# Patient Record
Sex: MALE | Race: WHITE
[De-identification: names, ages, dates, MRNs, and addresses within clinical notes are randomized per-mention and may not be internally consistent; named-entity substitution may affect disease eponyms.]

---

## 2017-05-24 ENCOUNTER — HOSPITAL ENCOUNTER (EMERGENCY)
Dept: HOSPITAL 25 - UCCORT | Age: 62
Discharge: HOME | End: 2017-05-24
Payer: MEDICARE

## 2017-05-24 VITALS — DIASTOLIC BLOOD PRESSURE: 82 MMHG | SYSTOLIC BLOOD PRESSURE: 134 MMHG

## 2017-05-24 DIAGNOSIS — Z87.891: ICD-10-CM

## 2017-05-24 DIAGNOSIS — Z88.0: ICD-10-CM

## 2017-05-24 DIAGNOSIS — J06.9: Primary | ICD-10-CM

## 2017-05-24 DIAGNOSIS — J44.9: ICD-10-CM

## 2017-05-24 PROCEDURE — 99212 OFFICE O/P EST SF 10 MIN: CPT

## 2017-05-24 PROCEDURE — G0463 HOSPITAL OUTPT CLINIC VISIT: HCPCS

## 2017-05-24 NOTE — UC
Throat Pain/Nasal Payam HPI





- HPI Summary


HPI Summary: 





THREE DAYS OF PRODUCTIVE COUGH, SINUS CONGESTION, CHEST CONGESTION. NO FEVER. 

NO WHEEZING. ON COUMADIN. 





- History of Current Complaint


Chief Complaint: UCRespiratory


Stated Complaint: COUGH,CHEST CONGESTION,HEADACHE


Time Seen by Provider: 05/24/17 10:01


Hx Obtained From: Patient


Onset/Duration: Gradual Onset, Lasting Days, Still Present


Severity: Mild


Cough: Productive


Associated Signs & Symptoms: Positive: Sinus Discomfort, Nasal Discharge





- Epiglottits Risk Factors


Epiglottis Risk Factors: Negative





- Allergies/Home Medications


Allergies/Adverse Reactions: 


 Allergies











Allergy/AdvReac Type Severity Reaction Status Date / Time


 


Penicillins Allergy Intermediate Hives Verified 05/24/17 09:55











Home Medications: 


 Home Medications





Warfarin TAB(*) [Coumadin TAB(*)] 6 mg PO DAILY@1700 05/24/17 [History 

Confirmed 05/24/17]











PMH/Surg Hx/FS Hx/Imm Hx


Previously Healthy: Yes


Endocrine History Of: 


   Denies: Diabetes


Cardiovascular History Of: 


   Denies: Cardiac Disorders, Hypertension, Pacemaker/ICD


Respiratory History Of: Reports: COPD


   Denies: Asthma





- Surgical History


Surgical History: Yes


Surgery Procedure, Year, and Place: Lumbar fusion 2005, appy 3/15.  Appy 4/15





- Family History


Known Family History: Positive: Cardiac Disease - CHF


   Negative: Hypertension, Diabetes





- Social History


Occupation: Retired


Lives: With Family


Alcohol Use: None


Substance Use Type: None


Smoking Status (MU): Former Smoker


When Did the Patient Quit Smoking/Using Tobacco: MARCH, 2014





- Immunization History


Most Recent Influenza Vaccination: 10/2015


Most Recent Pneumonia Vaccination: 2015





Review of Systems


Constitutional: Negative


Skin: Negative


Eyes: Negative


ENT: Nasal Discharge


Respiratory: Cough


Cardiovascular: Negative


Gastrointestinal: Negative


Genitourinary: Negative


Motor: Negative


Neurovascular: Negative


Musculoskeletal: Negative


Neurological: Negative


Psychological: Negative


All Other Systems Reviewed And Are Negative: Yes





Physical Exam


Triage Information Reviewed: Yes


Appearance: Well-Appearing, No Pain Distress, Well-Nourished


Vital Signs: 


 Initial Vital Signs











Temp  98.2 F   05/24/17 09:52


 


Pulse  75   05/24/17 09:52


 


Resp  16   05/24/17 09:52


 


BP  134/82   05/24/17 09:52


 


Pulse Ox  98   05/24/17 09:52











Vital Signs Reviewed: Yes


Eye Exam: Normal


ENT Exam: Normal


ENT: Positive: Normal ENT inspection, Hearing grossly normal, TMs normal


Dental Exam: Normal


Neck exam: Normal


Neck: Positive: Supple, Nontender, No Lymphadenopathy


Respiratory Exam: Normal


Respiratory: Positive: Chest non-tender, Lungs clear, Normal breath sounds, No 

respiratory distress


Cardiovascular Exam: Normal


Cardiovascular: Positive: RRR, No Murmur, Pulses Normal


Abdominal Exam: Normal


Musculoskeletal Exam: Normal


Neurological Exam: Normal


Psychological Exam: Normal


Skin Exam: Normal





Throat Pain/Nasal Course/Dx





- Differential Dx/Diagnosis


Differential Diagnosis/HQI/PQRI: Sinusitis, URI, Other - BRONCHITIS


Provider Diagnoses: UPPER RESPIRATORY INFECTION





Discharge





- Discharge Plan


Condition: Stable


Disposition: HOME


Prescriptions: 


Benzonatate CAP* [Tessalon 100 MG CAP*] 100 mg PO TID PRN #15 cap


 PRN Reason: Cough


Patient Education Materials:  Upper Respiratory Infection (ED)


Referrals: 


Antwan Moreno MD [Primary Care Provider] -

## 2017-09-27 ENCOUNTER — HOSPITAL ENCOUNTER (EMERGENCY)
Dept: HOSPITAL 25 - UCCORT | Age: 62
Discharge: HOME | End: 2017-09-27
Payer: MEDICARE

## 2017-09-27 VITALS — SYSTOLIC BLOOD PRESSURE: 137 MMHG | DIASTOLIC BLOOD PRESSURE: 86 MMHG

## 2017-09-27 DIAGNOSIS — S00.261A: Primary | ICD-10-CM
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
## 2017-09-27 DIAGNOSIS — H60.93: ICD-10-CM
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
## 2017-09-27 PROCEDURE — 99212 OFFICE O/P EST SF 10 MIN: CPT

## 2017-09-27 PROCEDURE — G0463 HOSPITAL OUTPT CLINIC VISIT: HCPCS

## 2017-09-27 NOTE — UC
Eye Complaint HPI





- HPI Summary


HPI Summary: 





He was working outside yesterday and today noted a small swelling in the inner 

corner of the right eye. It is not tender. No known trauma. there is also ev 

ear itching and tenderness. 





- History of Current Complaint


Chief Complaint: UCEye


Stated Complaint: RIGHT EYE COMPLAINT


Time Seen by Provider: 09/27/17 10:03


Hx Obtained From: Patient


Onset/Duration: Gradual Onset, Lasting Days


Timing: Constant


Severity Initially: Mild


Severity Currently: Mild


Location of Injury: Periorbital


Aggravating Factor(s): Nothing


Alleviating Factor(s): Nothing


Associated Signs And Symptoms: Positive: Swelling - of the inner eye lid. this 

started this morning. the ears have been bothersome for several days..  Negative

: Photophobia, Drainage (Clear), Drainage (Purulent), Vision Impairment 

Bilateral, Vision Impairment Right, Vision Impairment Left, Fever





- Allergies/Home Medications


Allergies/Adverse Reactions: 


 Allergies











Allergy/AdvReac Type Severity Reaction Status Date / Time


 


Penicillins Allergy Intermediate Hives Verified 09/27/17 09:48














PMH/Surg Hx/FS Hx/Imm Hx


Previously Healthy: No





- Surgical History


Surgical History: Yes


Surgery Procedure, Year, and Place: Lumbar fusion 2005, appy 3/15.  Appy 4/15





- Family History


Known Family History: Positive: Cardiac Disease - CHF


   Negative: Hypertension, Diabetes





- Social History


Alcohol Use: None


Substance Use Type: None


Smoking Status (MU): Former Smoker


When Did the Patient Quit Smoking/Using Tobacco: MARCH, 2014





- Immunization History


Most Recent Influenza Vaccination: no 2017-18


Most Recent Pneumonia Vaccination: 2015





Review of Systems


Eyes: Other - eye lid swelling.


ENT: Ear Ache


All Other Systems Reviewed And Are Negative: Yes





Physical Exam


Triage Information Reviewed: Yes


Appearance: Well-Appearing, No Pain Distress, Well-Nourished


Vital Signs: 


 Initial Vital Signs











Temp  98.5 F   09/27/17 09:43


 


Pulse  71   09/27/17 09:43


 


Resp  15   09/27/17 09:43


 


BP  137/86   09/27/17 09:43


 


Pulse Ox  97   09/27/17 09:43











Vital Signs Reviewed: Yes


ENT: Positive: Pharynx normal, Other: - ev canals mildly tender. no obvious 

swelling..  Negative: Pharyngeal erythema, Nasal congestion, TMs normal, TM 

bulging, Tonsillar swelling, Tonsillar exudate, Trismus


Neck exam: Normal


Neck: Positive: Supple, Nontender, No Lymphadenopathy


Respiratory Exam: Normal


Respiratory: Positive: Normal breath sounds, No respiratory distress


Cardiovascular: Positive: Brisk Capillary Refill


Abdomen Description: Negative: Distended


Musculoskeletal: Positive: Strength Intact, No Edema


Neurological: Positive: Alert, Muscle Tone Normal


Skin: Positive: rashes - right inner eye lid soft swelling without redness or 

fluctuance.





Eye Complaint Course/Dx





- Course


Course Of Treatment: we agreed to observe the eye lid and if it worsens, we can 

start the doxycline. He was warned that the antibiotics may affect coumadin and 

INR and that he will need to be rechecked.





- Differential Dx/Diagnosis


Differential Diagnosis/HQI/PQRI: Conjunctivitis, Corneal Abrasion


Provider Diagnoses: right eye lid insect bite.  ev otitis externa.





Discharge





- Discharge Plan


Condition: Good


Disposition: HOME


Prescriptions: 


Ciproflox/Dexameth OTIC.SUSP* [Ciprodex OTIC.SUSP*] 2 drop .SEE ORDER TID #1 btl


DOXYcycline CAP(*) [DOXYcycline 100MG CAP(*)] 100 mg PO BID #14 cap


Patient Education Materials:  Otitis Externa (ED), Insect Bite or Sting (ED)


Referrals: 


Antwan Moreno MD [Primary Care Provider] - If Needed

## 2018-01-14 ENCOUNTER — HOSPITAL ENCOUNTER (EMERGENCY)
Dept: HOSPITAL 25 - UCCORT | Age: 63
Discharge: HOME | End: 2018-01-14
Payer: MEDICARE

## 2018-01-14 VITALS — SYSTOLIC BLOOD PRESSURE: 138 MMHG | DIASTOLIC BLOOD PRESSURE: 89 MMHG

## 2018-01-14 DIAGNOSIS — Z87.891: ICD-10-CM

## 2018-01-14 DIAGNOSIS — Z86.718: ICD-10-CM

## 2018-01-14 DIAGNOSIS — Z88.0: ICD-10-CM

## 2018-01-14 DIAGNOSIS — M54.42: Primary | ICD-10-CM

## 2018-01-14 PROCEDURE — 99212 OFFICE O/P EST SF 10 MIN: CPT

## 2018-01-14 PROCEDURE — G0463 HOSPITAL OUTPT CLINIC VISIT: HCPCS

## 2018-01-14 NOTE — UC
Back Pain HPI





- HPI Summary


HPI Summary: 





61 yo male with onset of left lower back pain radiating down left leg


constant x 2-3 weeks


has hx sciatica with lumbar spine surgery





no bowel/bladder dysfunction





unable to take NSAIDs (on coumadin)











- History of Current Complaint


Chief Complaint: UCBackPain


Stated Complaint: BACK PAIN


Time Seen by Provider: 01/14/18 11:46


Hx Obtained From: Patient


Onset/Duration: Gradual Onset, Lasting Weeks - 2-3


Timing: Constant


Severity Initially: Mild


Severity Currently: Severe


Pain Intensity: 8


Pain Scale Used: 0-10 Numeric


Back Pain: Is Discrete @, Radiates To - left leg to foot


Character: Aching, Burning


Aggravating Factor(s): Other - sitting/raising left leg


Alleviating Factor(s): Rest, Position


Associated Signs And Symptoms: Positive: Negative


Related History: Similar Episode Dx As - sciatica





- Allergies/Home Medications


Allergies/Adverse Reactions: 


 Allergies











Allergy/AdvReac Type Severity Reaction Status Date / Time


 


Penicillins Allergy Intermediate Hives Verified 01/14/18 11:11











Home Medications: 


 Home Medications





Acetaminophen [Eql Acetaminophen Extra] 500 mg PO PRN 01/14/18 [History]


Tiotropium Bromide-Olodaterol [Stiolto Respimat 2.5-2.5 Mcg/Act] 1 aer IN 01/14/ 18 [History]











PMH/Surg Hx/FS Hx/Imm Hx


Previously Healthy: Yes


Cardiovascular History: Deep Vein Thrombosis


Respiratory History: Pulmonary Embolism





- Surgical History


Surgical History: Yes


Surgery Procedure, Year, and Place: Lumbar fusion 2005,.  Appy 4/15.  

PERITONITIS ?





- Family History


Known Family History: Positive: Cardiac Disease - CHF, Other - no FHx DVT


   Negative: Hypertension, Diabetes





- Social History


Alcohol Use: None


Substance Use Type: None


Smoking Status (MU): Former Smoker


When Did the Patient Quit Smoking/Using Tobacco: MARCH, 2014





- Immunization History


Most Recent Influenza Vaccination: no 2017-18


Most Recent Pneumonia Vaccination: 2015





Review of Systems


Constitutional: Negative


Skin: Negative


Eyes: Negative


ENT: Negative


Respiratory: Negative


Cardiovascular: Negative


Gastrointestinal: Negative


Genitourinary: Negative


Motor: Negative


Neurovascular: Negative


Musculoskeletal: Arthralgia, Myalgia


Neurological: Negative


Psychological: Negative


Is Patient Immunocompromised?: No


All Other Systems Reviewed And Are Negative: Yes





Physical Exam


Triage Information Reviewed: Yes


Appearance: Well-Appearing, No Pain Distress, Well-Nourished


Vital Signs: 


 Initial Vital Signs











Temp  98.7 F   01/14/18 11:14


 


Pulse  92   01/14/18 11:14


 


Resp  18   01/14/18 11:14


 


BP  138/89   01/14/18 11:14


 


Pulse Ox  99   01/14/18 11:14











Vital Signs Reviewed: Yes


Eyes: Positive: Conjunctiva Clear


ENT: Negative: Hearing grossly normal - [place], Nasal congestion, Nasal drainage, 

Trismus, Muffled voice, Hoarse voice


Neck: Positive: Supple, Nontender


Respiratory: Positive: Normal breath sounds, No respiratory distress, No 

accessory muscle use, Respiratory distress


Cardiovascular: Positive: RRR, No Murmur


Neurological: Positive: Alert, Other: - decreased ankle jerk left (+) SLR left


Psychological Exam: Normal


Skin Exam: Normal





Back Pain Course/Dx





- Differential Dx/Diagnosis


Provider Diagnoses: acute sciatica





Discharge





- Discharge Plan


Condition: Stable


Disposition: HOME


Prescriptions: 


Cyclobenzaprine TAB* [Flexeril TAB*] 5 mg PO TID PRN #15 tab


 PRN Reason: Spasms


HYDROcodone/ACETAMIN 5-325 MG* [Norco 5-325 TAB*] 1 tab PO Q4H PRN #15 tab MDD 4


 PRN Reason: Pain


Methylprednisolone [Medrol Dosepak 4 MG*] 0 mg PO .SEE LIAM INSTRUCTION #1 tab


Patient Education Materials:  Sciatica (ED)


Referrals: 


Antwan Moreno MD [Primary Care Provider] - As Soon As Possible


Additional Instructions: 


recheck this week


to ER for new or worsening symptoms

## 2018-04-20 ENCOUNTER — HOSPITAL ENCOUNTER (EMERGENCY)
Dept: HOSPITAL 25 - UCCORT | Age: 63
Discharge: HOME | End: 2018-04-20
Payer: MEDICARE

## 2018-04-20 VITALS — SYSTOLIC BLOOD PRESSURE: 140 MMHG | DIASTOLIC BLOOD PRESSURE: 87 MMHG

## 2018-04-20 DIAGNOSIS — J44.9: Primary | ICD-10-CM

## 2018-04-20 DIAGNOSIS — Z88.0: ICD-10-CM

## 2018-04-20 DIAGNOSIS — Z87.891: ICD-10-CM

## 2018-04-20 PROCEDURE — G0463 HOSPITAL OUTPT CLINIC VISIT: HCPCS

## 2018-04-20 PROCEDURE — 99212 OFFICE O/P EST SF 10 MIN: CPT

## 2018-04-20 NOTE — UC
Respiratory Complaint HPI





- HPI Summary


HPI Summary: 





Patient presents with increased cough and chest tightness, chills, hx of COPD





- History of Current Complaint


Hx Obtained From: Patient


Onset/Duration: Sudden Onset, Lasting Days


Timing: Constant


Severity Initially: Mild


Severity Currently: Mild


Pain Intensity: 0


Character: Cough: Nonproductive


Aggravating Factors: Deep Breaths


Alleviating Factors: Nothing


Associated Signs And Symptoms: Positive: Chills, Wheezing





<Margret Carvalho - Last Filed: 04/22/18 12:35>





<Adrianne Singh - Last Filed: 04/22/18 16:38>





- History of Current Complaint


Chief Complaint: UCRespiratory


Stated Complaint: COUGH, CONGESTION


Time Seen by Provider: 04/20/18 10:33





- Allergies/Home Medications


Allergies/Adverse Reactions: 


 Allergies











Allergy/AdvReac Type Severity Reaction Status Date / Time


 


Penicillins Allergy  Hives Verified 04/20/18 10:04











Home Medications: 


 Home Medications





guaiFENesin ER TAB [Mucinex*] 1,200 mg PO Q6H PRN 04/20/18 [History Confirmed 04 /20/18]











PMH/Surg Hx/FS Hx/Imm Hx


Previously Healthy: No


Cardiovascular History: Cardiac Disease


Respiratory History: COPD





- Surgical History


Surgical History: Yes


Surgery Procedure, Year, and Place: Lumbar fusion 2005,.  Appy 4/15.  

PERITONITIS





- Family History


Known Family History: Positive: Cardiac Disease - CHF, Other - no FHx DVT


   Negative: Hypertension, Diabetes





- Social History


Alcohol Use: None


Substance Use Type: None


Smoking Status (MU): Former Smoker


When Did the Patient Quit Smoking/Using Tobacco: MARCH, 2010





- Immunization History


Most Recent Influenza Vaccination: no 2017-18


Most Recent Pneumonia Vaccination: 2015





<Margret Carvalho - Last Filed: 04/22/18 12:35>





Review of Systems


Constitutional: Chills, Fatigue


Skin: Negative


Eyes: Negative


ENT: Negative


Respiratory: Shortness Of Breath, Cough


Cardiovascular: Negative


Gastrointestinal: Negative


Genitourinary: Negative


Motor: Negative


Neurovascular: Negative


Musculoskeletal: Negative


Neurological: Headache


Psychological: Negative


Is Patient Immunocompromised?: No


All Other Systems Reviewed And Are Negative: Yes





<Margret Carvalho - Last Filed: 04/22/18 12:35>





Physical Exam


Triage Information Reviewed: Yes


Appearance: Well-Nourished, Ill-Appearing, Pain Distress


Vital Signs: 


 Initial Vital Signs











Temp  98.5 F   04/20/18 09:55


 


Pulse  86   04/20/18 09:55


 


Resp  18   04/20/18 09:55


 


BP  140/87   04/20/18 09:55


 


Pulse Ox  97   04/20/18 09:55











Vital Signs Reviewed: Yes


Eye Exam: Normal


ENT: Positive: Pharyngeal erythema, TM bulging, TM dull


Dental Exam: Normal


Neck exam: Normal


Neck: Positive: Supple, Nontender


Respiratory: Positive: Chest non-tender, No respiratory distress, No accessory 

muscle use, Wheezing, Inspiration


Cardiovascular Exam: Normal


Cardiovascular: Positive: RRR, No Murmur, Pulses Normal


Abdominal Exam: Normal


Abdomen Description: Positive: Nontender, No Organomegaly, Soft


Bowel Sounds: Positive: Present


Musculoskeletal Exam: Normal


Neurological Exam: Normal


Psychological Exam: Normal


Skin Exam: Normal





<aMrgret Carvalho - Last Filed: 04/22/18 12:35>


Vital Signs: 


 Initial Vital Signs











Temp  98.5 F   04/20/18 09:55


 


Pulse  86   04/20/18 09:55


 


Resp  18   04/20/18 09:55


 


BP  140/87   04/20/18 09:55


 


Pulse Ox  97   04/20/18 09:55














<Adrianne Singh - Last Filed: 04/22/18 16:38>





UC Diagnostic Evaluation





- Laboratory


O2 Sat by Pulse Oximetry: 97





<Margret Carvalho - Last Filed: 04/22/18 12:35>





Respiratory Course/Dx





- Course


Course Of Treatment: hx obtained, exam performed ,meds reviewed, treated for 

bronchitis, Hx of COPD, advised to notify PCP due to prescribed ABX and 

warfarin use





- Differential Dx/Diagnosis


Differential Diagnosis/HQI/PQRI: Asthma, Bronchitis, Exacerbation Of COPD, 

Influenza, Laryngitis, Lower Resp Infection, Sinusitis


Provider Diagnoses: COPD.  Bronchitis





<Margret Carvalho - Last Filed: 04/22/18 12:35>





Discharge





- Sign-Out/Discharge


Documenting (check all that apply): Discharge





- Billing Disposition and Condition


Condition: STABLE


Disposition: HOME





<Margret Carvalho - Last Filed: 04/22/18 12:35>





- Billing Disposition and Condition


Condition: STABLE


Disposition: HOME





<Adrianne Singh - Last Filed: 04/22/18 16:38>





- Discharge Plan


Condition: Stable


Disposition: HOME


Prescriptions: 


Azithromyxin LUC (NF) [Z-Luc (Zithromax) 250 mg tabs #6] 2 tab PO .TODAY, THEN 

1 DAILY #6 tab


predniSONE TAB* [Deltasone TAB*] 14 mg PO DAILY #7 tab


Patient Education Materials:  Acute Bronchitis (ED)


Referrals: 


Antwan Moreno MD [Primary Care Provider] - 


Additional Instructions: 


1. take the medication as prescribed.


2. Use your PRO - AIR as needed every 4 hours for cough, SOB,


3. COntinue with Mucinex as needed.


4. Follow up if not improving in the next 2-3 days.


5. Remember to let your primary doctor know about the antibiotics to evaluated 

PT/INR





Attestation Statement


User Type: Provider - I was available for consult. This patient was seen by the 

[facility]. The patient was not presented to, seen by, or examined by me. -Debra





<Adrianne Singh - Last Filed: 04/22/18 16:38>

## 2018-09-23 ENCOUNTER — HOSPITAL ENCOUNTER (EMERGENCY)
Dept: HOSPITAL 25 - UCCORT | Age: 63
Discharge: LEFT BEFORE DISCHARGE | End: 2018-09-23
Payer: MEDICARE

## 2018-09-23 DIAGNOSIS — R05: Primary | ICD-10-CM

## 2018-09-23 DIAGNOSIS — Z53.21: ICD-10-CM

## 2018-09-23 DIAGNOSIS — R09.89: ICD-10-CM

## 2018-09-24 ENCOUNTER — HOSPITAL ENCOUNTER (EMERGENCY)
Dept: HOSPITAL 25 - UCCORT | Age: 63
Discharge: HOME | End: 2018-09-24
Payer: MEDICARE

## 2018-09-24 VITALS — DIASTOLIC BLOOD PRESSURE: 75 MMHG | SYSTOLIC BLOOD PRESSURE: 122 MMHG

## 2018-09-24 DIAGNOSIS — R05: ICD-10-CM

## 2018-09-24 DIAGNOSIS — Z88.0: ICD-10-CM

## 2018-09-24 DIAGNOSIS — I10: ICD-10-CM

## 2018-09-24 DIAGNOSIS — J98.01: Primary | ICD-10-CM

## 2018-09-24 DIAGNOSIS — F17.210: ICD-10-CM

## 2018-09-24 PROCEDURE — 71046 X-RAY EXAM CHEST 2 VIEWS: CPT

## 2018-09-24 PROCEDURE — 99212 OFFICE O/P EST SF 10 MIN: CPT

## 2018-09-24 PROCEDURE — G0463 HOSPITAL OUTPT CLINIC VISIT: HCPCS

## 2018-09-24 NOTE — RAD
INDICATION: Wheezing and cough x2 weeks



COMPARISON: Most recent comparison chest x-rays dated March 19, 2011



TECHNIQUE: PA and lateral views of the chest were obtained.



FINDINGS:



The heart and mediastinum are normal in size and contour.



The lungs are grossly clear. There is no evidence of large pleural effusion.



Visualized bones are normal for the patient's age.



There is no radiographic evidence of free air beneath the diaphragm



IMPRESSION: 

No radiographic evidence of acute cardiopulmonary disease.

## 2018-09-24 NOTE — UC
General HPI





- HPI Summary


HPI Summary: 





Patient states that he has had a productive cough for the past 2 weeks and is 

now raising brown sputum.  He admits to wheezing but denies shortness of breath 

or fever.  He states that he may have some COPD.  There is no associated chest 

pain and he is not a smoker.  He does not offer that he has had pneumonia a 

couple of times in the past.





- History of Current Complaint


Chief Complaint: UCRespiratory


Stated Complaint: COUGH


Time Seen by Provider: 09/24/18 10:18


Hx Obtained From: Patient


Onset/Duration: Gradual Onset


Timing: Constant


Pain Intensity: 5


Alleviating: Nothing


Associated Signs & Symptoms: Positive: Cough, Wheezing.  Negative: Chest Pain, 

Fever





- Allergy/Home Medications


Allergies/Adverse Reactions: 


 Allergies











Allergy/AdvReac Type Severity Reaction Status Date / Time


 


Penicillins Allergy  Hives Verified 09/24/18 10:13











Home Medications: 


 Home Medications





GuaiFENesin DM* [Robitussin DM*] 5 ml PO Q6H PRN 09/24/18 [History Confirmed 09/ 24/18]


traZODone TAB* [Desyrel TAB*] 50 mg PO BEDTIME 09/24/18 [History Confirmed 09/24 /18]











PMH/Surg Hx/FS Hx/Imm Hx





- Additional Past Medical History


Additional PMH: 





DVT, PE, hypertension, pneumonia and questionable COPD





- Surgical History


Surgical History: Yes


Surgery Procedure, Year, and Place: Lumbar fusion 2005,.  Appy 4/15.  

PERITONITIS





- Family History


Known Family History: Positive: Cardiac Disease - CHF, Other - no FHx DVT


   Negative: Hypertension, Diabetes





- Social History


Occupation: Retired


Lives: With Family


Alcohol Use: None


Substance Use Type: None


Smoking Status (MU): Former Smoker


When Did the Patient Quit Smoking/Using Tobacco: MARCH, 2010





- Immunization History


Most Recent Influenza Vaccination: no 2017-18


Most Recent Pneumonia Vaccination: 2015


Vaccination Up to Date: Yes





Review of Systems


Constitutional: Negative


Skin: Negative


Eyes: Negative


ENT: Negative


Respiratory: Cough


Cardiovascular: Negative


Gastrointestinal: Negative


Genitourinary: Negative


Motor: Negative


Neurovascular: Negative


Musculoskeletal: Negative


Neurological: Negative


Psychological: Negative


Is Patient Immunocompromised?: No


All Other Systems Reviewed And Are Negative: Yes





Physical Exam


Triage Information Reviewed: Yes


Appearance: Well-Appearing


Vital Signs: 


 Initial Vital Signs











Temp  97.6 F   09/24/18 10:10


 


Pulse  75   09/24/18 10:10


 


Resp  16   09/24/18 10:10


 


BP  122/75   09/24/18 10:10


 


Pulse Ox  97   09/24/18 10:10











Vital Signs Reviewed: Yes


Eyes: Positive: Conjunctiva Clear


ENT: Positive: Pharynx normal, TMs normal.  Negative: Nasal congestion, Nasal 

drainage


Neck: Positive: Supple, Nontender, No Lymphadenopathy


Respiratory: Positive: No respiratory distress, Other: - Diffuse coarse rhonchi 

and wheezes.  Congested cough.


Cardiovascular: Positive: RRR, No Murmur


Abdomen Description: Positive: Nontender, No Organomegaly, Soft


Bowel Sounds: Positive: Present


Musculoskeletal: Positive: ROM Intact


Neurological: Positive: Alert


Psychological: Positive: Age Appropriate Behavior


Skin Exam: Normal





Diagnostics





- Radiology


  ** No standard instances


Radiology Interpretation Completed By: Radiologist - No radiographic evidence 

of acute cardiopulmonary disease.





Re-Evaluation





- Re-Evaluation


  ** First Eval


Re-Evaluation Time: 10:53


Change: Improved - much less rhonchi and wheezing, pt feeling better and better 

aeration.





Course/Dx





- Course


Course Of Treatment: worsening cough with diffuse rhonchi and wheezing x 2 

weeks with evolving purulent sputum and ? copd thus will cover for presumptive 

bacterail infection.  pt advised of potential increase inr while on antibiotic 

with the warfarin. he statses he has taken zpak before and tolerated it well 

plus it worked well for him.





- Differential Dx - Multi-Symptom


Provider Diagnoses: bronchospasm. cough.





Discharge





- Sign-Out/Discharge


Documenting (check all that apply): Patient Departure


All imaging exams completed and their final reports reviewed: Yes





- Discharge Plan


Condition: Improved


Disposition: HOME


Prescriptions: 


Albuterol 2.5MG/3ML (0.083%)* [Ventolin 2.5 MG/3 ML NEB.SOL*] 2.5 mg INH Q6H #1 

box


Azithromycin TAB* [Zithromax TAB (Z-LIAM) 250 mg #6 tabs] 2 tab PO .TODAY, THEN 

1 DAILY #1 liam


predniSONE TAB* [Deltasone 20 MG TAB*] 40 mg PO DAILY 3 Days #6 tab


Patient Education Materials:  Bronchospasm (ED), Acute Cough (ED)


Referrals: 


Antwan Moreno MD [Primary Care Provider] - 5 Days





- Billing Disposition and Condition


Condition: IMPROVED


Disposition: Home

## 2018-09-24 NOTE — UC
Discharge





- Sign-Out/Discharge


Documenting (check all that apply): Post-Discharge Follow Up


All imaging exams completed and their final reports reviewed: No Studies





- Discharge Plan


Disposition: LEFT WITHOUT BEING SEEN


Referrals: 


Antwan Moreno MD [Primary Care Provider] - 





- Billing Disposition and Condition


Disposition: Left Without Being Seen